# Patient Record
Sex: MALE | URBAN - METROPOLITAN AREA
[De-identification: names, ages, dates, MRNs, and addresses within clinical notes are randomized per-mention and may not be internally consistent; named-entity substitution may affect disease eponyms.]

---

## 2019-04-22 NOTE — PATIENT DISCUSSION
MYOPIA , OU- DISC OPT OF REFRACTIVE SX-VS-GLS/[id number]. PT UNDERSTANDS OPTIONS AND DESIRES TO PROCEED WITH REFRACTIVE SX TO IMPROVE VA AND REDUCE DEPENDENCY ON GLS/CTLS.

## 2019-04-25 NOTE — PATIENT DISCUSSION
MYOPIA, OU- DISC OPT OF REFRACTIVE SX-VS-GLS/[id number]. PT UNDERSTANDS OPTIONS AND DESIRES TO PROCEED WITH REFRACTIVE SX TO IMPROVE VA AND REDUCE DEPENDENCY ON GLS/CTLS.

## 2019-04-26 NOTE — PATIENT DISCUSSION
Continue: Pred Forte (prednisolone acetate): drops,suspension: 1% 1 drop as directed into affected eye

## 2019-11-25 ENCOUNTER — IMPORTED ENCOUNTER (OUTPATIENT)
Dept: URBAN - METROPOLITAN AREA CLINIC 9 | Facility: CLINIC | Age: 59
End: 2019-11-25

## 2019-12-02 ENCOUNTER — IMPORTED ENCOUNTER (OUTPATIENT)
Dept: URBAN - METROPOLITAN AREA CLINIC 9 | Facility: CLINIC | Age: 59
End: 2019-12-02

## 2021-10-16 ASSESSMENT — TONOMETRY
OD_IOP_MMHG: 16
OS_IOP_MMHG: 15
OS_IOP_MMHG: 16
OD_IOP_MMHG: 16

## 2021-10-16 ASSESSMENT — VISUAL ACUITY
OS_CC: 20/30 SN
OS_CC: 20/30 SN
OD_CC: 20/20 SN
OD_CC: 20/20 SN

## 2022-07-08 RX ORDER — VALACYCLOVIR HYDROCHLORIDE 1 G/1
TABLET, FILM COATED ORAL
COMMUNITY

## 2022-07-08 RX ORDER — VALSARTAN 320 MG/1
TABLET ORAL
COMMUNITY

## 2022-07-08 RX ORDER — AMLODIPINE BESYLATE 5 MG/1
TABLET ORAL
COMMUNITY

## 2022-09-16 PROBLEM — N40.0 BENIGN PROSTATE HYPERPLASIA: Status: ACTIVE | Noted: 2022-09-16

## 2022-09-16 PROBLEM — B02.29 POSTHERPETIC NEURALGIA: Status: ACTIVE | Noted: 2022-09-16

## 2022-09-16 PROBLEM — G47.00 INSOMNIA: Status: ACTIVE | Noted: 2022-09-16

## 2022-09-16 PROBLEM — E78.5 HYPERLIPIDEMIA: Status: ACTIVE | Noted: 2022-09-16

## 2022-09-16 PROBLEM — D36.9 ADENOMATOUS POLYP: Status: ACTIVE | Noted: 2022-09-16

## 2022-09-16 PROBLEM — I10 ESSENTIAL HYPERTENSION: Status: ACTIVE | Noted: 2022-09-16

## 2023-03-18 NOTE — PATIENT DISCUSSION
[place]--------------------(0.6 X 110)-------------------(0.56 X 86) Physical Therapy        03/18/23 1030   Appointment Info   Signing Clinician's Name / Credentials (PT) Piper Jones DPT   Living Environment   Current Living Arrangements house   Home Accessibility no concerns   Living Environment Comments pt states that he has plenty of assistance from family and friends, every day if necessary   Self-Care   Usual Activity Tolerance good   Current Activity Tolerance fair   Equipment Currently Used at Home walker, rolling;grab bar, tub/shower;grab bar, toilet   Fall history within last six months yes   Number of times patient has fallen within last six months   (many)   Activity/Exercise/Self-Care Comment independent at baseline   General Information   Onset of Illness/Injury or Date of Surgery 03/17/23   Referring Physician Ezequiel Holliday MD   Patient/Family Therapy Goals Statement (PT) none stated   Pertinent History of Current Problem (include personal factors and/or comorbidities that impact the POC) chronic subdural hematoma, ETOH, ventricular tachycardia   Existing Precautions/Restrictions fall   Cognition   Cognitive Status Comments pt alert, cooperative. Shaky   Pain Assessment   Patient Currently in Pain No   Integumentary/Edema   Integumentary/Edema no deficits were identifed   Posture    Posture Forward head position   Range of Motion (ROM)   Range of Motion ROM is WFL   Strength (Manual Muscle Testing)   Strength (Manual Muscle Testing) Deficits observed during functional mobility   Bed Mobility   Comment, (Bed Mobility) Romi supine to sit   Transfers   Comment, (Transfers) Romi sit to stand, unsteady   Gait/Stairs (Locomotion)   Assistive Device (Gait)   (none, HHA)   Distance in Feet 6'   Distance in Feet (Gait) 65', 20', 45'  (rest breaks between each)   Deviations/Abnormal Patterns (Gait) base of support, wide;stride length decreased;gait speed decreased   Comment, (Gait/Stairs) Romi with HHA, pt shaky and unsteady. Wide JASON and requiring UE support to maintain  balance   Balance   Balance Comments impaired, very high fall risk   Sensory Examination   Sensory Perception patient reports no sensory changes   Coordination   Coordination Comments impaired   Muscle Tone   Muscle Tone no deficits were identified   Clinical Impression   Criteria for Skilled Therapeutic Intervention Yes, treatment indicated   PT Diagnosis (PT) gait instability   Influenced by the following impairments impaired strength, balance, and coordination   Functional limitations due to impairments high fall risk, limited household mobility   Clinical Presentation (PT Evaluation Complexity) Evolving/Changing   Clinical Presentation Rationale pt still in withdrawal, symptoms may still be developing   Clinical Decision Making (Complexity) moderate complexity   Planned Therapy Interventions (PT) balance training;bed mobility training;gait training;home exercise program;neuromuscular re-education;stair training;strengthening;transfer training   Anticipated Equipment Needs at Discharge (PT) walker, rolling  (has)   Risk & Benefits of therapy have been explained evaluation/treatment results reviewed;care plan/treatment goals reviewed;participants voiced agreement with care plan;participants included;patient   PT Total Evaluation Time   PT Eval, Moderate Complexity Minutes (15188) 10   Physical Therapy Goals   PT Frequency Daily   PT Predicted Duration/Target Date for Goal Attainment 03/28/23   PT Goals Bed Mobility;Transfers;Gait   PT: Bed Mobility Independent;Supine to/from sit   PT: Transfers Supervision/stand-by assist;Sit to/from stand   PT: Gait Supervision/stand-by assist;150 feet;Assistive device   Interventions   Interventions Quick Adds Therapeutic Activity;Gait Training   Therapeutic Activity   Therapeutic Activities: dynamic activities to improve functional performance Minutes (16974) 8   Symptoms Noted During/After Treatment Shortness of breath;Significant change in vital signs  (tachycardia)    Treatment Detail/Skilled Intervention sit<>stand with Romi to maintain balance, modA to perform toilet transfer (low toilet with right grab bar). pt able to pull up front half of pants with modA for balance, needed help with back of pants and Romi to maintain balance with both hands on walker.   Gait Training   Gait Training Minutes (20924) 10   Symptoms Noted During/After Treatment (Gait Training) shortness of breath   Treatment Detail/Skilled Intervention GT with FWW, Romi. Cues for breathing, posture, keeping walker centered and close to body, general safety. Multiple LOB requiring Romi to correct.   [place] Level (Gait Training) minimum assist (75% patient effort)   Physical Assistance Level (Gait Training) 1 person assist;verbal cues;nonverbal cues (demo/gestures)   Weight Bearing (Gait Training) full weight-bearing   Assistive Device (Gait Training) rolling walker   Gait Analysis Deviations increased stride width;decreased stride length;increased time in double stance   Impairments (Gait Analysis/Training) balance impaired;coordination impaired;strength decreased   PT Discharge Planning   PT Plan GT with FWW, standing ex/balance as carla   PT Discharge Recommendation (DC Rec) Transitional Care Facility   PT Rationale for DC Rec high risk of falls, poor activity tolerance, weakness. Will continue to assess as withdrawal progresses   PT Brief overview of current status not safe to d/c home, assist of 1(to 2) for mobility and cares   Total Session Time   Timed Code Treatment Minutes 18   Total Session Time (sum of timed and untimed services) 28       Pt was educated on the role of physical therapy in their care, and indicated understanding.      Piper Jones, DPT 3/18/2023
